# Patient Record
Sex: FEMALE | Race: WHITE | Employment: UNEMPLOYED | URBAN - NONMETROPOLITAN AREA
[De-identification: names, ages, dates, MRNs, and addresses within clinical notes are randomized per-mention and may not be internally consistent; named-entity substitution may affect disease eponyms.]

---

## 2017-02-03 ENCOUNTER — TELEPHONE (OUTPATIENT)
Dept: OPHTHALMOLOGY | Age: 47
End: 2017-02-03

## 2017-11-06 ENCOUNTER — TELEPHONE (OUTPATIENT)
Dept: OPTOMETRY | Age: 47
End: 2017-11-06

## 2017-11-06 NOTE — TELEPHONE ENCOUNTER
Patient stopped by window on Wednesday 11/1/2017 and scheduled an appointment for December for her annual CE and stated that she is out of her contacts and asked if she could get a trial pair. Dr. Ladarius Condon was out of the office at that time.   Patient came back today to check up, writer spoke with Dr. Ladarius Condon and was given ok to pull a pair of trial.    Given [employer] bc: 8.6 -2.00 (r) -2.50 (L)

## 2017-11-27 ENCOUNTER — OFFICE VISIT (OUTPATIENT)
Dept: OPTOMETRY | Age: 47
End: 2017-11-27
Payer: COMMERCIAL

## 2017-11-27 DIAGNOSIS — H52.13 MYOPIA OF BOTH EYES WITH ASTIGMATISM AND PRESBYOPIA: Primary | ICD-10-CM

## 2017-11-27 DIAGNOSIS — H52.203 MYOPIA OF BOTH EYES WITH ASTIGMATISM AND PRESBYOPIA: Primary | ICD-10-CM

## 2017-11-27 DIAGNOSIS — H52.4 MYOPIA OF BOTH EYES WITH ASTIGMATISM AND PRESBYOPIA: Primary | ICD-10-CM

## 2017-11-27 PROCEDURE — 92014 COMPRE OPH EXAM EST PT 1/>: CPT | Performed by: OPTOMETRIST

## 2017-11-27 RX ORDER — BENOXINATE HCL/FLUORESCEIN SOD 0.4%-0.25%
1 DROPS OPHTHALMIC (EYE) ONCE
Status: COMPLETED | OUTPATIENT
Start: 2017-11-27 | End: 2017-11-27

## 2017-11-27 RX ORDER — ALBUTEROL SULFATE 2.5 MG/3ML
2.5 SOLUTION RESPIRATORY (INHALATION)
COMMUNITY
Start: 2017-06-30

## 2017-11-27 RX ADMIN — Medication 1 DROP: at 14:32

## 2017-11-27 ASSESSMENT — KERATOMETRY
OS_AXISANGLE_DEGREES: 091
OD_K2POWER_DIOPTERS: 46.00
OS_AXISANGLE2_DEGREES: 001
OD_AXISANGLE_DEGREES: 090
OD_K1POWER_DIOPTERS: 44.25
OS_K2POWER_DIOPTERS: 45.50
OD_AXISANGLE2_DEGREES: 180
OS_K1POWER_DIOPTERS: 44.75

## 2017-11-27 ASSESSMENT — REFRACTION_CURRENTRX
OD_BASECURVE: 8.3
OS_CYLINDER: DS
OD_BRAND: VISTAKON: ACUVUE 2
OS_BRAND: VISTAKON: ACUVUE 2
OS_SPHERE: -2.00
OS_BASECURVE: 8.3
OD_CYLINDER: DS
OD_SPHERE: -2.00

## 2017-11-27 ASSESSMENT — TONOMETRY
OS_IOP_MMHG: 14
IOP_METHOD: APPLANATION W FLURESS DROP
OD_IOP_MMHG: 14

## 2017-11-27 ASSESSMENT — VISUAL ACUITY
OD_CC: 20/30 OU
CORRECTION_TYPE: CONTACTS
OS_CC: 20/20
METHOD: SNELLEN - LINEAR

## 2017-11-27 ASSESSMENT — REFRACTION_MANIFEST
OS_AXIS: 026
OS_ADD: +1.00
OD_SPHERE: -2.25
OD_ADD: +1.00
OD_CYLINDER: DS
OS_SPHERE: -2.50
OS_CYLINDER: -0.25

## 2017-11-27 ASSESSMENT — REFRACTION_WEARINGRX
OS_AXIS: 020
OD_SPHERE: -1.75
OD_AXIS: 155
OS_SPHERE: -1.75
OS_CYLINDER: -0.75
OD_CYLINDER: -0.50
SPECS_TYPE: SVL

## 2017-11-27 ASSESSMENT — SLIT LAMP EXAM - LIDS
COMMENTS: NORMAL
COMMENTS: NORMAL

## 2017-11-27 NOTE — PROGRESS NOTES
Disha Huber presents today for   Chief Complaint   Patient presents with    Blurred Vision    Vision Exam   .    HPI     Blurred Vision   In both eyes. Vision is blurred. Context:  night driving and reading. Comments   Last Vision Exam: 9/13/2016 AW  Last Ophthalmology Exam: n/a  Last Filled Glasses Rx: 9/24/2013  Insurance: [employer]  Update: Contacts and Glasses  Has noticed that her night vision as become worse.  does not get paid till Friday, will not have the 37.00 until then. Patient is currently wearing Acuvue 2 contacts; does not like the [employer].                       Main Ophthalmology Exam     External Exam       Right Left    External Normal Normal          Slit Lamp Exam       Right Left    Lids/Lashes Normal Normal    Conjunctiva/Sclera White and quiet White and quiet    Cornea Clear Clear    Anterior Chamber Deep and quiet Deep and quiet    Iris Round and reactive Round and reactive    Lens Clear Clear    Vitreous Normal Normal          Fundus Exam       Right Left    Disc Normal Normal    C/D Ratio 0.15 0.15    Macula Normal Normal    Vessels Normal Normal    Periphery Normal Normal                  Tonometry     Tonometry (Applanation w Fluress drop, 2:57 PM)       Right Left    Pressure 14 14                  Visual Acuity (Snellen - Linear)       Right Left    Dist cc 20/20 20/20    Near cc 20/30 OU     Correction:  Contacts        Keratometry     Keratometry       K1 Axis K2 Axis    Right 44.25 180 46.00 090    Left 44.75 001 45.50 091                Ophthalmology Exam     Wearing Rx       Sphere Cylinder Axis    Right -1.75 -0.50 155    Left -1.75 -0.75 020    Age:  4yrs    Type:  SVL                Manifest Refraction     Manifest Refraction       Sphere Cylinder [place] Dist [place] Add Near formerly [facility]    Right -2.25 ds  20/20 +1.00     Left -2.50 -0.25 026 20/25 +1.00 20/20          Manifest Refraction #2 (Auto)       Sphere Cylinder [place] Dist VA Add Near formerly [facility]    Right -2.00

## 2018-01-11 ENCOUNTER — TELEPHONE (OUTPATIENT)
Dept: OPTOMETRY | Age: 48
End: 2018-01-11

## 2018-01-11 NOTE — TELEPHONE ENCOUNTER
Patient would like to order 1 box each eye. Will pay out of pocket for these.    Brand Base Curve Sphere Cylinder   Right [place]: Acuvue 2 8.3 -2.25 ds   Left [place]: Acuvue 2 8.3 -2.50 ds   Expiration Date: 11/28/2018   Wearing Schedule: Daily wear

## 2018-02-05 ENCOUNTER — TELEPHONE (OUTPATIENT)
Dept: OPTOMETRY | Age: 48
End: 2018-02-05

## 2018-04-25 ENCOUNTER — TELEPHONE (OUTPATIENT)
Dept: OPTOMETRY | Age: 48
End: 2018-04-25

## 2019-02-14 ENCOUNTER — OFFICE VISIT (OUTPATIENT)
Dept: OPTOMETRY | Age: 49
End: 2019-02-14
Payer: COMMERCIAL

## 2019-02-14 DIAGNOSIS — H52.13 MYOPIA OF BOTH EYES WITH ASTIGMATISM AND PRESBYOPIA: Primary | ICD-10-CM

## 2019-02-14 DIAGNOSIS — H52.203 MYOPIA OF BOTH EYES WITH ASTIGMATISM AND PRESBYOPIA: Primary | ICD-10-CM

## 2019-02-14 DIAGNOSIS — H52.4 MYOPIA OF BOTH EYES WITH ASTIGMATISM AND PRESBYOPIA: Primary | ICD-10-CM

## 2019-02-14 PROCEDURE — 92014 COMPRE OPH EXAM EST PT 1/>: CPT | Performed by: OPTOMETRIST

## 2019-02-14 ASSESSMENT — REFRACTION_MANIFEST
OS_CYLINDER: -0.25
OD_SPHERE: -2.00
OS_AXIS: 175
OS_ADD: +1.50
OD_CYLINDER: -0.50
OD_ADD: +1.50
OS_SPHERE: -3.00
OD_AXIS: 010

## 2019-02-14 ASSESSMENT — REFRACTION_CURRENTRX
OD_BRAND: VISTAKON: ACUVUE 2
OS_SPHERE: -2.50
OS_BRAND: VISTAKON: ACUVUE 2
OS_BASECURVE: 8.3
OD_CYLINDER: DS
OD_SPHERE: -2.25
OD_BASECURVE: 8.3
OS_CYLINDER: DS

## 2019-02-14 ASSESSMENT — REFRACTION_WEARINGRX
SPECS_TYPE: 1ST BIFOCAL
OD_ADD: +1.00
OS_CYLINDER: -0.25
OD_CYLINDER: DS
OS_AXIS: 026
OD_SPHERE: -2.25
OS_SPHERE: -2.50
OS_ADD: +1.00

## 2019-02-14 ASSESSMENT — TONOMETRY
OD_IOP_MMHG: 10
IOP_METHOD: NON-CONTACT AIR PUFF
OS_IOP_MMHG: 12

## 2019-02-14 ASSESSMENT — VISUAL ACUITY
OS_CC: 20/20
OS_CC+: -1
METHOD: SNELLEN - LINEAR
CORRECTION_TYPE: CONTACTS

## 2019-02-14 ASSESSMENT — KERATOMETRY
OS_K2POWER_DIOPTERS: 45.50
OS_AXISANGLE2_DEGREES: 172
OS_AXISANGLE_DEGREES: 082
OS_K1POWER_DIOPTERS: 44.75

## 2019-02-14 ASSESSMENT — SLIT LAMP EXAM - LIDS
COMMENTS: NORMAL
COMMENTS: NORMAL

## 2019-03-26 ENCOUNTER — TELEPHONE (OUTPATIENT)
Dept: OPTOMETRY | Age: 49
End: 2019-03-26

## 2019-04-22 ENCOUNTER — OFFICE VISIT (OUTPATIENT)
Dept: OPTOMETRY | Age: 49
End: 2019-04-22

## 2019-04-22 DIAGNOSIS — H52.4 MYOPIA OF BOTH EYES WITH ASTIGMATISM AND PRESBYOPIA: Primary | ICD-10-CM

## 2019-04-22 DIAGNOSIS — H52.203 MYOPIA OF BOTH EYES WITH ASTIGMATISM AND PRESBYOPIA: Primary | ICD-10-CM

## 2019-04-22 DIAGNOSIS — H52.13 MYOPIA OF BOTH EYES WITH ASTIGMATISM AND PRESBYOPIA: Primary | ICD-10-CM

## 2019-04-22 PROCEDURE — 99999 PR OFFICE/OUTPT VISIT,PROCEDURE ONLY: CPT | Performed by: OPTOMETRIST

## 2019-04-22 ASSESSMENT — REFRACTION_CURRENTRX
OS_SPHERE: -3.00
OS_BASECURVE: 8.3
OS_CYLINDER: DS
OD_CYLINDER: DS
OS_BRAND: VISTAKON: ACUVUE 2
OD_SPHERE: -2.25
OD_BASECURVE: 8.3
OD_BRAND: VISTAKON: ACUVUE 2

## 2019-04-22 ASSESSMENT — VISUAL ACUITY
CORRECTION_TYPE: CONTACTS
OS_CC+: -1
METHOD: SNELLEN - LINEAR
OS_CC: 20/20
OD_CC: 20/40 OU
OD_CC+: -1

## 2019-05-02 ENCOUNTER — TELEPHONE (OUTPATIENT)
Dept: OPTOMETRY | Age: 49
End: 2019-05-02

## 2019-05-02 NOTE — TELEPHONE ENCOUNTER
Patient would like to order 2 boxes (1 box each eye)    Final Contact Lens Rx        Brand Base Curve Sphere Cylinder     Right B and L ultra  8.6 -2.25 ds     Left B and L ultra  8.6 -3.00 ds     Expiration Date:  4/22/2020     Replacement:  Monthly     Wearing Schedule:  Daily wear

## 2019-12-04 ENCOUNTER — HOSPITAL ENCOUNTER (EMERGENCY)
Age: 49
Discharge: HOME | End: 2019-12-04
Attending: EMERGENCY MEDICINE
Payer: MEDICARE

## 2019-12-04 ENCOUNTER — APPOINTMENT (OUTPATIENT)
Dept: CT IMAGING | Age: 49
End: 2019-12-04
Payer: MEDICARE

## 2019-12-04 VITALS
DIASTOLIC BLOOD PRESSURE: 81 MMHG | HEART RATE: 67 BPM | TEMPERATURE: 98.4 F | RESPIRATION RATE: 16 BRPM | OXYGEN SATURATION: 97 % | SYSTOLIC BLOOD PRESSURE: 136 MMHG

## 2019-12-04 DIAGNOSIS — R55 SYNCOPE AND COLLAPSE: ICD-10-CM

## 2019-12-04 DIAGNOSIS — R51.9 ACUTE NONINTRACTABLE HEADACHE, UNSPECIFIED HEADACHE TYPE: Primary | ICD-10-CM

## 2019-12-04 LAB
-: NORMAL
ABSOLUTE EOS #: 0.2 K/UL (ref 0–0.4)
ABSOLUTE IMMATURE GRANULOCYTE: NORMAL K/UL (ref 0–0.3)
ABSOLUTE LYMPH #: 3.4 K/UL (ref 1–4.8)
ABSOLUTE MONO #: 0.9 K/UL (ref 0.1–1.2)
ANION GAP SERPL CALCULATED.3IONS-SCNC: 10 MMOL/L (ref 9–17)
BASOPHILS # BLD: 1 % (ref 0–1)
BASOPHILS ABSOLUTE: 0.1 K/UL (ref 0–0.2)
BUN BLDV-MCNC: 11 MG/DL (ref 6–20)
BUN/CREAT BLD: 17 (ref 9–20)
CALCIUM SERPL-MCNC: 9.5 MG/DL (ref 8.6–10.4)
CHLORIDE BLD-SCNC: 100 MMOL/L (ref 98–107)
CO2: 26 MMOL/L (ref 20–31)
CREAT SERPL-MCNC: 0.66 MG/DL (ref 0.5–0.9)
DIFFERENTIAL TYPE: NORMAL
EKG ATRIAL RATE: 72 BPM
EKG P AXIS: -4 DEGREES
EKG P-R INTERVAL: 150 MS
EKG Q-T INTERVAL: 400 MS
EKG QRS DURATION: 144 MS
EKG QTC CALCULATION (BAZETT): 438 MS
EKG R AXIS: -23 DEGREES
EKG T AXIS: 68 DEGREES
EKG VENTRICULAR RATE: 72 BPM
EOSINOPHILS RELATIVE PERCENT: 1 % (ref 1–7)
GFR AFRICAN AMERICAN: >60 ML/MIN
GFR NON-AFRICAN AMERICAN: >60 ML/MIN
GFR SERPL CREATININE-BSD FRML MDRD: NORMAL ML/MIN/{1.73_M2}
GFR SERPL CREATININE-BSD FRML MDRD: NORMAL ML/MIN/{1.73_M2}
GLUCOSE BLD-MCNC: 97 MG/DL (ref 70–99)
HCT VFR BLD CALC: 40.5 % (ref 36–46)
HEMOGLOBIN: 13.4 G/DL (ref 12–16)
IMMATURE GRANULOCYTES: NORMAL %
LYMPHOCYTES # BLD: 31 % (ref 16–46)
MCH RBC QN AUTO: 28.7 PG (ref 26–34)
MCHC RBC AUTO-ENTMCNC: 33.1 G/DL (ref 31–37)
MCV RBC AUTO: 86.6 FL (ref 80–100)
MONOCYTES # BLD: 8 % (ref 4–11)
NRBC AUTOMATED: NORMAL PER 100 WBC
PDW BLD-RTO: 14.5 % (ref 11–14.5)
PLATELET # BLD: 238 K/UL (ref 140–450)
PLATELET ESTIMATE: NORMAL
PMV BLD AUTO: 6.9 FL (ref 6–12)
POTASSIUM SERPL-SCNC: 4.5 MMOL/L (ref 3.7–5.3)
RBC # BLD: 4.67 M/UL (ref 4–5.2)
RBC # BLD: NORMAL 10*6/UL
REASON FOR REJECTION: NORMAL
SEG NEUTROPHILS: 59 % (ref 43–77)
SEGMENTED NEUTROPHILS ABSOLUTE COUNT: 6.5 K/UL (ref 1.8–7.7)
SODIUM BLD-SCNC: 136 MMOL/L (ref 135–144)
TROPONIN INTERP: NORMAL
TROPONIN T: NORMAL NG/ML
TROPONIN, HIGH SENSITIVITY: 7 NG/L (ref 0–14)
WBC # BLD: 11 K/UL (ref 3.5–11)
WBC # BLD: NORMAL 10*3/UL
ZZ NTE CLEAN UP: ORDERED TEST: NORMAL
ZZ NTE WITH NAME CLEAN UP: SPECIMEN SOURCE: NORMAL

## 2019-12-04 PROCEDURE — 85025 COMPLETE CBC W/AUTO DIFF WBC: CPT

## 2019-12-04 PROCEDURE — 80048 BASIC METABOLIC PNL TOTAL CA: CPT

## 2019-12-04 PROCEDURE — 6360000002 HC RX W HCPCS: Performed by: EMERGENCY MEDICINE

## 2019-12-04 PROCEDURE — 93005 ELECTROCARDIOGRAM TRACING: CPT | Performed by: EMERGENCY MEDICINE

## 2019-12-04 PROCEDURE — 70450 CT HEAD/BRAIN W/O DYE: CPT

## 2019-12-04 PROCEDURE — 99284 EMERGENCY DEPT VISIT MOD MDM: CPT

## 2019-12-04 PROCEDURE — 96374 THER/PROPH/DIAG INJ IV PUSH: CPT

## 2019-12-04 PROCEDURE — 36415 COLL VENOUS BLD VENIPUNCTURE: CPT

## 2019-12-04 PROCEDURE — 84484 ASSAY OF TROPONIN QUANT: CPT

## 2019-12-04 RX ORDER — SIMVASTATIN 10 MG
10 TABLET ORAL NIGHTLY
COMMUNITY

## 2019-12-04 RX ORDER — KETOROLAC TROMETHAMINE 30 MG/ML
30 INJECTION, SOLUTION INTRAMUSCULAR; INTRAVENOUS ONCE
Status: COMPLETED | OUTPATIENT
Start: 2019-12-04 | End: 2019-12-04

## 2019-12-04 RX ORDER — FLUTICASONE FUROATE AND VILANTEROL 100; 25 UG/1; UG/1
POWDER RESPIRATORY (INHALATION) DAILY
COMMUNITY

## 2019-12-04 RX ORDER — CITALOPRAM 10 MG/1
10 TABLET ORAL DAILY
COMMUNITY

## 2019-12-04 RX ADMIN — KETOROLAC TROMETHAMINE 30 MG: 30 INJECTION, SOLUTION INTRAMUSCULAR at 13:58

## 2019-12-04 ASSESSMENT — PAIN SCALES - GENERAL
PAINLEVEL_OUTOF10: 9

## 2019-12-04 ASSESSMENT — ENCOUNTER SYMPTOMS
SHORTNESS OF BREATH: 0
EYE PAIN: 0
ABDOMINAL PAIN: 0
NAUSEA: 0
BACK PAIN: 0
VOMITING: 0
DIARRHEA: 0
COUGH: 0

## 2019-12-04 ASSESSMENT — PAIN DESCRIPTION - DESCRIPTORS: DESCRIPTORS: PRESSURE

## 2019-12-04 ASSESSMENT — PAIN DESCRIPTION - PAIN TYPE: TYPE: ACUTE PAIN

## 2019-12-04 ASSESSMENT — PAIN DESCRIPTION - LOCATION: LOCATION: HEAD

## 2020-02-24 ENCOUNTER — OFFICE VISIT (OUTPATIENT)
Dept: OPTOMETRY | Age: 50
End: 2020-02-24
Payer: COMMERCIAL

## 2020-02-24 PROCEDURE — 92014 COMPRE OPH EXAM EST PT 1/>: CPT | Performed by: OPTOMETRIST

## 2020-02-24 ASSESSMENT — REFRACTION_CURRENTRX
OS_BASECURVE: 8.6
OD_BASECURVE: 8.6
OS_BRAND: B AND L ULTRA
OS_SPHERE: -3.00
OS_CYLINDER: DS
OD_BRAND: B AND L ULTRA
OD_CYLINDER: DS
OD_SPHERE: -2.25

## 2020-02-24 ASSESSMENT — REFRACTION_MANIFEST
OD_SPHERE: -2.00
OS_SPHERE: -3.00
OD_ADD: +1.75
OS_CYLINDER: DS
OS_ADD: +1.75
OD_AXIS: 010
OD_CYLINDER: -0.75

## 2020-02-24 ASSESSMENT — TONOMETRY
OD_IOP_MMHG: 16
OS_IOP_MMHG: 19
IOP_METHOD: NON-CONTACT AIR PUFF

## 2020-02-24 ASSESSMENT — SLIT LAMP EXAM - LIDS
COMMENTS: NORMAL
COMMENTS: NORMAL

## 2020-02-24 ASSESSMENT — KERATOMETRY
OD_AXISANGLE2_DEGREES: 007
OD_K2POWER_DIOPTERS: 45.75
OD_K1POWER_DIOPTERS: 44.25
METHOD_AUTO_MANUAL: AUTOMATED
OD_AXISANGLE_DEGREES: 097

## 2020-02-24 ASSESSMENT — REFRACTION_WEARINGRX
OS_SPHERE: -2.50
OS_CYLINDER: -0.25
OD_ADD: +1.00
OD_SPHERE: -2.25
OS_AXIS: 026
OD_CYLINDER: DS
SPECS_TYPE: 1ST BIFOCAL
OS_ADD: +1.00

## 2020-02-24 ASSESSMENT — VISUAL ACUITY
METHOD: SNELLEN - LINEAR
OS_CC: 20/25
CORRECTION_TYPE: CONTACTS

## 2020-02-24 NOTE — PROGRESS NOTES
Concern    None   Social History Narrative    None     Past Medical History:   Diagnosis Date    Anxiety     Congenital heart disease     Status post repair of an endocardial cushion defect at age 3-1/2. Cleft mitral valve with mitral regurgitation. Tricuspid regurgitation.     Depression     Myopia        Neuro/Psych     Neuro/Psych     Oriented x3:  Yes    Mood/Affect:  Normal                Main Ophthalmology Exam     External Exam       Right Left    External Normal Normal          Slit Lamp Exam       Right Left    Lids/Lashes Normal Normal    Conjunctiva/Sclera White and quiet White and quiet    Cornea Clear Clear    Anterior Chamber Deep and quiet Deep and quiet    Iris Round and reactive Round and reactive    Lens Clear Clear    Vitreous Normal Normal          Fundus Exam       Right Left    Disc Normal Normal    C/D Ratio 0.15-.2 0.15-.2    Macula Normal Normal    Vessels Normal Normal                  Tonometry     Tonometry (Non-contact air puff, 11:26 AM)       Right Left    Pressure 16 19   IOPg: 15.0              16.9  CH:  9.6          8.6  WS: 9.1          4.4                     Visual Acuity (Snellen - Linear)       Right Left    Dist cc 20/20 20/25    Correction:  Contacts        Keratometry     Keratometry (Automated)       K1 Axis K2 Axis    Right 44.25 007 45.75 097    Left                    Ophthalmology Exam     Wearing Rx       Sphere Cylinder Axis Add    Right -2.25 ds  +1.00    Left -2.50 -0.25 026 +1.00    Age:  3yrs    Type:  1st bifocal                 Manifest Refraction     Manifest Refraction       Sphere Cylinder Axis Dist VA Add    Right -2.00 -0.75 010 20/20 +1.75    Left -3.00 ds  20/20 +1.75          Manifest Refraction #2 (Auto)       Sphere Cylinder Axis Dist VA Add    Right -2.00 -0.75 009      Left -3.00 -0.25 014                   Final Rx       Sphere Cylinder Axis Add    Right -2.00 -0.75 010 +1.75    Left -3.00 ds  +1.75    Type:  PAL    Expiration Date:  2/24/2022 Contact Lens Current Rx     Current Contact Lens Rx (Ordered)       Brand Base Curve Sphere Cylinder    Right B and L ultra  8.6 -2.25 ds    Left B and L ultra  8.6 -3.00 ds          Current Contact Lens Rx #2 (Trial Lens)       Brand Base Curve Sphere Cylinder    Right ciba dailies aquacomfort plus   -2.50 ds    Left ciba dailies aquacomfort plus   -3.00 ds                We will try the 1 day moist distance only;  Use +1.50 or +1.75 readers over the top        Plan:     1. Myopia of both eyes with astigmatism and presbyopia             Patient Instructions   Gave trials of the dailies aqua comfott plus  distance only;      Suggest +1.50 -+1.75 over the top of the contact lenses for reading      Return in about 1 week (around 3/2/2020) for contact lens follow-up.

## 2020-03-10 ENCOUNTER — OFFICE VISIT (OUTPATIENT)
Dept: OPTOMETRY | Age: 50
End: 2020-03-10
Payer: MEDICARE

## 2020-03-10 PROCEDURE — 99211 OFF/OP EST MAY X REQ PHY/QHP: CPT

## 2020-03-10 PROCEDURE — 99999 PR OFFICE/OUTPT VISIT,PROCEDURE ONLY: CPT | Performed by: OPTOMETRIST

## 2020-03-10 ASSESSMENT — REFRACTION_WEARINGRX
OS_ADD: +1.75
OD_CYLINDER: -0.75
SPECS_TYPE: PAL
OD_SPHERE: -2.00
OD_AXIS: 010
OD_ADD: +1.75
OS_SPHERE: -3.00
OS_CYLINDER: DS

## 2020-03-10 ASSESSMENT — REFRACTION_CURRENTRX
OS_CYLINDER: DS
OD_CYLINDER: DS
OD_SPHERE: -2.50
OS_SPHERE: -3.00

## 2020-03-10 ASSESSMENT — VISUAL ACUITY
OS_CC: 20/25
METHOD: SNELLEN - LINEAR
CORRECTION_TYPE: CONTACTS

## 2020-03-19 ENCOUNTER — TELEPHONE (OUTPATIENT)
Dept: OPTOMETRY | Age: 50
End: 2020-03-19

## 2020-03-19 NOTE — TELEPHONE ENCOUNTER
Pt called complaining she was never told that to have glasses shipped to home would cost her $10.68 refused to pay is having glasses shipped back want her RX sent to her home with out being charged she is going elsewhere

## 2020-04-14 ENCOUNTER — TELEPHONE (OUTPATIENT)
Dept: OPTOMETRY | Age: 50
End: 2020-04-14

## 2020-05-14 ENCOUNTER — TELEPHONE (OUTPATIENT)
Dept: OPTOMETRY | Age: 50
End: 2020-05-14

## 2020-05-14 NOTE — TELEPHONE ENCOUNTER
Pull B and L ultra  -2.25 for the right eye and -3.00 for the left eye and these are the B and L lenses she was given at her exam visit. Please offer her an appointment in June for blurred vision with the glasses. / and or a copy of her glasses prescription can also be given when she picks up her trials.

## 2020-05-14 NOTE — TELEPHONE ENCOUNTER
Called patient. She will  trials on Monday and schedule appointment to be seen with new glasses. Patient stated she wanted it made right since she can not see with the glasses.

## 2020-05-26 ENCOUNTER — TELEPHONE (OUTPATIENT)
Dept: OPTOMETRY | Age: 50
End: 2020-05-26

## 2020-05-26 NOTE — TELEPHONE ENCOUNTER
Patient called stating that the trial contacts were not the ones that she needed. Patient stated that the ones she needed is the Acuvue Oasys every 2 wk Dailys. So patient is asking for a trial pair of those.        Final Contact Lens Rx #2      Brand Base Curve Sphere Cylinder   Right acuvue oaysis  8.4 -2.50 ds   Left acuvue oaysis  8.4 -3.00 ds   Replacement: Every 2 weeks   Wearing Schedule: Daily wear

## 2020-06-25 ENCOUNTER — OFFICE VISIT (OUTPATIENT)
Dept: OPTOMETRY | Age: 50
End: 2020-06-25
Payer: COMMERCIAL

## 2020-06-25 PROCEDURE — 92014 COMPRE OPH EXAM EST PT 1/>: CPT | Performed by: OPTOMETRIST

## 2020-06-25 ASSESSMENT — REFRACTION_WEARINGRX
OD_AXIS: 010
OS_CYLINDER: DS
OD_CYLINDER: -0.25
SPECS_TYPE: PAL
OD_SPHERE: -2.25
OS_SPHERE: -2.75

## 2020-06-25 ASSESSMENT — REFRACTION_MANIFEST
OD_SPHERE: -2.25
OS_CYLINDER: -0.25
OD_AXIS: 020
OS_SPHERE: -3.25
OD_CYLINDER: -0.50
OS_AXIS: 180

## 2020-06-25 ASSESSMENT — VISUAL ACUITY
OD_CC: 20/20 OU
OS_CC: 20/50
CORRECTION_TYPE: GLASSES
OS_CC+: -1
OD_CC+: -1
METHOD: SNELLEN - LINEAR

## 2020-06-25 ASSESSMENT — TONOMETRY
OD_IOP_MMHG: 17
OS_IOP_MMHG: 21
IOP_METHOD: NON-CONTACT AIR PUFF

## 2020-06-25 ASSESSMENT — REFRACTION_CURRENTRX
OD_BRAND: ACUVUE OAYSIS
OS_BASECURVE: 8.4
OD_BASECURVE: 8.4
OS_SPHERE: -3.00
OD_CYLINDER: DS
OS_CYLINDER: DS
OS_BRAND: ACUVUE OAYSIS
OD_SPHERE: -2.50

## 2020-06-25 ASSESSMENT — SLIT LAMP EXAM - LIDS
COMMENTS: NORMAL
COMMENTS: NORMAL

## 2020-06-25 NOTE — PROGRESS NOTES
Andree Breaux presents today for   Chief Complaint   Patient presents with    Blurred Vision    Vision Exam   .    HPI     Blurred Vision     In both eyes. Vision is blurred. Context:  distance vision and reading. Comments     Last Vision Exam: 3/10/2020 Aw  Last Ophthalmology Exam: n/a  Last Filled Glasses Rx: 3/10/2020  Insurance: [employer]  Update: Glasses and contacts  Distance and reading are still blurry  Has to be right on top of road signs to see them clearly.   Likes the Acuvue Oasys contacts  The left eye seems worse again   The near is ok                      Family History   Problem Relation Age of Onset    Diabetes Mother     Cataracts Mother     Diabetes Father     Glaucoma Neg Hx        Social History     Socioeconomic History    Marital status:      Spouse name: None    Number of children: None    Years of education: None    Highest education level: None   Occupational History    None   Social Needs    Financial resource strain: None    Food insecurity     Worry: None     Inability: None    Transportation needs     Medical: None     Non-medical: None   Tobacco Use    Smoking status: Never Smoker    Smokeless tobacco: Never Used   Substance and Sexual Activity    Alcohol use: No    Drug use: No    Sexual activity: None   Lifestyle    Physical activity     Days per week: None     Minutes per session: None    Stress: None   Relationships    Social connections     Talks on phone: None     Gets together: None     Attends Rastafari service: None     Active member of club or organization: None     Attends meetings of clubs or organizations: None     Relationship status: None    Intimate partner violence     Fear of current or ex partner: None     Emotionally abused: None     Physically abused: None     Forced sexual activity: None   Other Topics Concern    None   Social History Narrative    None     Past Medical History:   Diagnosis Date    Anxiety     Congenital heart disease     Status post repair of an endocardial cushion defect at age 3-1/2. Cleft mitral valve with mitral regurgitation. Tricuspid regurgitation.     Depression     Myopia        Neuro/Psych     Neuro/Psych     Oriented x3:  Yes    Mood/Affect:  Normal                Main Ophthalmology Exam     External Exam       Right Left    External Normal Normal          Slit Lamp Exam       Right Left    Lids/Lashes Normal Normal    Conjunctiva/Sclera White and quiet White and quiet    Cornea Clear Clear    Anterior Chamber Deep and quiet Deep and quiet    Iris Round and reactive Round and reactive    Lens Trace Nuclear sclerosis Trace Nuclear sclerosis    Vitreous Normal Normal          Fundus Exam       Right Left    Disc Normal Normal    C/D Ratio 0.15-.2 0.15-.2    Macula Normal Normal    Vessels Normal Normal                  Tonometry     Tonometry (Non-contact air puff, 3:56 PM)       Right Left    Pressure 17 21   IOPg:  15.8             17.5  CH:  9.7          7.7  WS: 4.7          3.0                     Visual Acuity (Snellen - Linear)       Right Left    Dist cc 20/25 -1 20/50 -1    Near cc 20/20 OU     Correction:  Glasses         Not recorded          Ophthalmology Exam     Wearing Rx       Sphere Cylinder Axis    Right -2.25 -0.25 010    Left -2.75 ds     Age:  3m    Type:  PAL                Manifest Refraction     Manifest Refraction       Sphere Cylinder [place] Dist VA    Right -2.25 -0.50 020 20/20-    Left -3.25 -0.25 180 20/20-          Manifest Refraction #2 (Auto)       Sphere Cylinder [place] Dist VA    Right -2.00 -0.75 019     Left -3.25 -0.25 180                  Final Rx       Sphere Cylinder Axis Add    Right -2.25 -0.50 020 +2.00    Left -3.25 -0.25 180 +2.00    Type:  PAL    Expiration Date:  6/26/2022           Contact Lens Current Rx     Current Contact Lens Rx       Brand Base Curve Sphere Cylinder    Right acuvue oaysis  8.4 -2.50 ds    Left acuvue oaysis  8.4 -3.00 ds

## 2020-06-29 ENCOUNTER — TELEPHONE (OUTPATIENT)
Dept: OPTOMETRY | Age: 50
End: 2020-06-29

## 2020-06-29 NOTE — TELEPHONE ENCOUNTER
Patient called stating that she did not want to place an order for contact lens yet, since she was being seen for stability of her eyes. She stated she would prefer to wait until she finds out what is going with her eyes. Patient tried calling after her appointment on 6/25/20, but our office was already closed and she called again on 6/26/20 and our office was closed on that day as well.

## 2020-07-27 ENCOUNTER — OFFICE VISIT (OUTPATIENT)
Dept: OPTOMETRY | Age: 50
End: 2020-07-27
Payer: MEDICARE

## 2020-07-27 PROCEDURE — 99212 OFFICE O/P EST SF 10 MIN: CPT | Performed by: OPTOMETRIST

## 2020-07-27 PROCEDURE — 1036F TOBACCO NON-USER: CPT | Performed by: OPTOMETRIST

## 2020-07-27 PROCEDURE — 3017F COLORECTAL CA SCREEN DOC REV: CPT | Performed by: OPTOMETRIST

## 2020-07-27 PROCEDURE — G8427 DOCREV CUR MEDS BY ELIG CLIN: HCPCS | Performed by: OPTOMETRIST

## 2020-07-27 PROCEDURE — 99211 OFF/OP EST MAY X REQ PHY/QHP: CPT

## 2020-07-27 PROCEDURE — G8421 BMI NOT CALCULATED: HCPCS | Performed by: OPTOMETRIST

## 2020-07-27 ASSESSMENT — REFRACTION_CURRENTRX
OS_BRAND: ACUVUE OAYSIS
OS_BASECURVE: 8.4
OD_SPHERE: -2.50
OD_BRAND: ACUVUE OAYSIS
OS_SPHERE: -3.00
OD_BASECURVE: 8.4
OD_CYLINDER: DS
OS_CYLINDER: DS

## 2020-07-27 ASSESSMENT — REFRACTION_MANIFEST
OD_CYLINDER: -0.50
OD_AXIS: 010
OD_ADD: +2.00
OS_CYLINDER: DS
OS_SPHERE: -3.25
OS_ADD: +2.00
OD_SPHERE: -2.50

## 2020-07-27 ASSESSMENT — REFRACTION_WEARINGRX
OS_CYLINDER: -0.25
OD_AXIS: 020
OS_SPHERE: -3.25
OS_ADD: +2.00
OD_CYLINDER: -0.50
OD_SPHERE: -2.25
OD_ADD: +2.00
OS_AXIS: 180

## 2020-07-27 ASSESSMENT — VISUAL ACUITY
OS_CC: 20/70
OD_CC+: -2
CORRECTION_TYPE: GLASSES
OD_CC: 20/25 OU
METHOD: SNELLEN - LINEAR

## 2020-07-27 ASSESSMENT — TONOMETRY
OS_IOP_MMHG: 19
OD_IOP_MMHG: 18
IOP_METHOD: NON-CONTACT AIR PUFF

## 2020-07-27 NOTE — PROGRESS NOTES
Merry Evans presents today for   Chief Complaint   Patient presents with    Blurred Vision    Vision Exam   .    HPI     Blurred Vision     In both eyes. Vision is blurred. Context:  distance vision and reading. Comments     Last Vision Exam: 6/25/2020 Aw  Last Ophthalmology Exam: n/a  Last Filled Glasses Rx: 6/25/2020  Insurance: [facility]  Update: 1 month Rx check for stability                   Current Outpatient Medications   Medication Sig Dispense Refill    simvastatin (ZOCOR) 10 MG tablet Take 10 mg by mouth nightly      fluticasone-vilanterol (BREO ELLIPTA) 100-25 MCG/INH AEPB inhaler Inhale into the lungs daily      citalopram (CELEXA) 10 MG tablet Take 10 mg by mouth daily      albuterol (PROVENTIL) (2.5 MG/3ML) 0.083% nebulizer solution Inhale 2.5 mg into the lungs      zolpidem (AMBIEN) 5 MG tablet Take 5 mg by mouth nightly as needed for Sleep      aspirin 81 MG tablet Take 81 mg by mouth daily.  calcium citrate-vitamin D (CITRICAL + D) 315-250 MG-UNIT TABS Take 600 mg by mouth Daily.  hydrochlorothiazide (HYDRODIURIL) 12.5 MG tablet Take 12.5 mg by mouth as needed.  loratadine (CLARITIN) 10 MG tablet Take 10 mg by mouth daily.  busPIRone (BUSPAR) 10 MG tablet Take 10 mg by mouth 2 times daily.  ibuprofen (ADVIL;MOTRIN) 600 MG tablet Take 600 mg by mouth every 6 hours as needed.  HYDROcodone-acetaminophen (NORCO)  MG per tablet Take 1 tablet by mouth every 6 hours as needed. No current facility-administered medications for this visit.           Family History   Problem Relation Age of Onset    Diabetes Mother     Cataracts Mother     Diabetes Father     Glaucoma Neg Hx      Social History     Socioeconomic History    Marital status:      Spouse name: None    Number of children: None    Years of education: None    Highest education level: None   Occupational History    None   Social Needs    Financial resource strain: None  Food insecurity     Worry: None     Inability: None    Transportation needs     Medical: None     Non-medical: None   Tobacco Use    Smoking status: Never Smoker    Smokeless tobacco: Never Used   Substance and Sexual Activity    Alcohol use: No    Drug use: No    Sexual activity: None   Lifestyle    Physical activity     Days per week: None     Minutes per session: None    Stress: None   Relationships    Social connections     Talks on phone: None     Gets together: None     Attends Mandaeism service: None     Active member of club or organization: None     Attends meetings of clubs or organizations: None     Relationship status: None    Intimate partner violence     Fear of current or ex partner: None     Emotionally abused: None     Physically abused: None     Forced sexual activity: None   Other Topics Concern    None   Social History Narrative    None     Past Medical History:   Diagnosis Date    Anxiety     Congenital heart disease     Status post repair of an endocardial cushion defect at age 3-1/2. Cleft mitral valve with mitral regurgitation. Tricuspid regurgitation.     Depression     Myopia             Not recorded         Tonometry     Tonometry (Non-contact air puff, 4:23 PM)       Right Left    Pressure 18 19   IOP.3             16.6  CH:  9.6          8.3  WS: 3.9          6.6                  Not recorded         Not recorded          Visual Acuity (Snellen - Linear)       Right Left    Dist cc 20/30 -2 20/70    Near cc 20/25 OU     Correction:  Glasses          Pupils     Pupils       Pupils    Right PERRL    Left PERRL              Neuro/Psych     Neuro/Psych     Oriented x3:  Yes    Mood/Affect:  Normal               Not recorded            Ophthalmology Exam     Wearing Rx       Sphere Cylinder Axis Add    Right -2.25 -0.50 020 +2.00    Left -3.25 -0.25 180 +2.00    Age:  1m    Type:  PAL : NOT FILLED          Wearing Rx #2       Sphere Cylinder Axis Add    Right -2.25 -0.25 010     Left -2.75 ds      Age:  4m    Type:  PAL : WEARING              Manifest Refraction     Manifest Refraction       Sphere Cylinder Axis Dist VA Add    Right -2.50 -0.50 010 20/25 +2.00    Left -3.25 ds  20/25 +2.00          Manifest Refraction #2 (Auto)       Sphere Cylinder Axis Dist VA Add    Right -2.00 -0.50 015      Left -3.00 -0.25 176                 Final Rx       Sphere Cylinder Axis Add    Right -2.50 -0.50 010 +2.00    Left -3.25 ds  +2.00    Expiration Date:  7/28/2022            1. Blurred vision, bilateral    2. Changes in vision    3.  Myopia of both eyes with astigmatism and presbyopia           Patient Instructions   New contact lenses trials given;  Call to order      Return in about 1 year (around 7/27/2021) for complete eye exam.

## 2021-01-21 NOTE — PROGRESS NOTES
Chad Nair presents today for   Chief Complaint   Patient presents with    2 Week Follow-Up   . HPI     Pt here today for contact lens check. Acuvu 2 with out bifocal , pt doesn't like them. Patient feels she isn't seeing as good as she could. Says she can't look at phone to read messages, and can not see the TV . Pt is not happy. isnt happy with oaysis or avuvue2                  Visual Acuity (Snellen - Linear)       Right Left    Dist cc 20/20 -1 20/20 -1    Near cc 20/40 OU      Correction:  Contacts          Contact Lens Current Rx     Current Contact Lens Rx       Brand Base Curve Sphere Cylinder    Right [place]: Acuvue 2 8.3 -2.25 ds    Left [place]: Acuvue 2 8.3 -3.00 ds                FINAL RX  Final Contact Lens Rx       Brand Base Curve Sphere Cylinder    Right B and L ultra  8.6 -2.25 ds    Left B and L ultra  8.6 -3.00 ds    Expiration Date:  4/22/2020    Replacement:  Monthly    Wearing Schedule:  Daily wear   Trials given               1. Myopia of both eyes with astigmatism and presbyopia         Patient Instructions   New trials given ;  Ultra   2weeks follow up ; Or call ahead of time if you want rosietamir to try      Return in about 2 weeks (around 5/6/2019) for contact lens follow-up.
No

## 2022-06-02 ENCOUNTER — OFFICE VISIT (OUTPATIENT)
Dept: OPTOMETRY | Age: 52
End: 2022-06-02
Payer: COMMERCIAL

## 2022-06-02 DIAGNOSIS — H52.4 MYOPIA OF BOTH EYES WITH ASTIGMATISM AND PRESBYOPIA: Primary | ICD-10-CM

## 2022-06-02 DIAGNOSIS — H52.203 MYOPIA OF BOTH EYES WITH ASTIGMATISM AND PRESBYOPIA: Primary | ICD-10-CM

## 2022-06-02 DIAGNOSIS — H53.8 BLURRED VISION, BILATERAL: ICD-10-CM

## 2022-06-02 DIAGNOSIS — H52.13 MYOPIA OF BOTH EYES WITH ASTIGMATISM AND PRESBYOPIA: Primary | ICD-10-CM

## 2022-06-02 PROCEDURE — 92015 DETERMINE REFRACTIVE STATE: CPT | Performed by: OPTOMETRIST

## 2022-06-02 PROCEDURE — 92014 COMPRE OPH EXAM EST PT 1/>: CPT | Performed by: OPTOMETRIST

## 2022-06-02 ASSESSMENT — REFRACTION_CURRENTRX
OS_SPHERE: -3.00
OD_SPHERE: -2.50
OD_BRAND: ACUVUE OAYSIS
OS_BASECURVE: 8.4
OD_BASECURVE: 8.4
OS_CYLINDER: DS
OS_BRAND: ACUVUE OAYSIS
OD_CYLINDER: DS

## 2022-06-02 ASSESSMENT — REFRACTION_WEARINGRX
OD_SPHERE: -2.50
OD_ADD: +2.00
OS_SPHERE: -3.25
OD_AXIS: 010
OS_CYLINDER: DS
OD_CYLINDER: -0.50
OS_ADD: +2.00
SPECS_TYPE: PAL

## 2022-06-02 ASSESSMENT — VISUAL ACUITY
OS_CC+: -1
OS_CC: 20/25
OD_CC+: -1
CORRECTION_TYPE: GLASSES
METHOD: SNELLEN - LINEAR
OD_CC: 20/25 OU

## 2022-06-02 ASSESSMENT — REFRACTION_MANIFEST
OS_CYLINDER: -0.25
OD_SPHERE: -2.25
OS_ADD: +2.25
OD_AXIS: 020
OD_ADD: +2.25
OS_SPHERE: -3.25
OD_CYLINDER: -0.75
OS_AXIS: 165

## 2022-06-02 ASSESSMENT — ENCOUNTER SYMPTOMS
RESPIRATORY NEGATIVE: 0
ALLERGIC/IMMUNOLOGIC NEGATIVE: 0
GASTROINTESTINAL NEGATIVE: 0
EYES NEGATIVE: 0

## 2022-06-02 ASSESSMENT — TONOMETRY
IOP_METHOD: NON-CONTACT AIR PUFF
OS_IOP_MMHG: 18
OD_IOP_MMHG: 16

## 2022-06-02 ASSESSMENT — KERATOMETRY
OD_AXISANGLE2_DEGREES: 010
OD_K1POWER_DIOPTERS: 44.50
METHOD_AUTO_MANUAL: AUTOMATED
OS_K2POWER_DIOPTERS: 46.00
OS_AXISANGLE2_DEGREES: 172
OD_AXISANGLE_DEGREES: 100
OS_AXISANGLE_DEGREES: 082
OD_K2POWER_DIOPTERS: 45.75
OS_K1POWER_DIOPTERS: 45.00

## 2022-06-02 NOTE — PROGRESS NOTES
Elisabet Devine presents today for   Chief Complaint   Patient presents with    Blurred Vision    Vision Exam   .    HPI     Blurred Vision     In both eyes. Vision is blurred. Context:  distance vision and reading. Comments     Last Vision Exam: 7/27/2020 Aw  Last Ophthalmology Exam: n/a  Last Filled Glasses Rx: 7/27/2020  Insurance: [employer]  Update: Glasses and Contacts  Distance and reading are getting more blurry                     ROS     Negative for: Constitutional, Gastrointestinal, Neurological, Skin, Genitourinary, Musculoskeletal, HENT, Endocrine, Cardiovascular, Eyes, Respiratory, Psychiatric, Allergic/Imm, Heme/Lymph          Family History   Problem Relation Age of Onset    Diabetes Mother     Cataracts Mother     Diabetes Father     Glaucoma Neg Hx        Social History     Socioeconomic History    Marital status:      Spouse name: None    Number of children: None    Years of education: None    Highest education level: None   Occupational History    None   Tobacco Use    Smoking status: Never Smoker    Smokeless tobacco: Never Used   Substance and Sexual Activity    Alcohol use: No    Drug use: No    Sexual activity: None   Other Topics Concern    None   Social History Narrative    None     Social Determinants of Health     Financial Resource Strain:     Difficulty of Paying Living Expenses: Not on file   Food Insecurity:     Worried About Running Out of Food in the Last Year: Not on file    [place] of Food in the Last Year: Not on file   Transportation Needs:     Lack of Transportation (Medical): Not on file    Lack of Transportation (Non-Medical):  Not on file   Physical Activity:     Days of Exercise per Week: Not on file    Minutes of Exercise per Session: Not on file   Stress:     Feeling of Stress : Not on file   Social Connections:     Frequency of Communication with Friends and Family: Not on file    Frequency of Social Gatherings with Friends and Family: Not on file    Attends Zoroastrianism Services: Not on file    Active Member of Clubs or Organizations: Not on file    Attends Club or Organization Meetings: Not on file    Marital Status: Not on file   Intimate Partner Violence:     Fear of Current or Ex-Partner: Not on file    Emotionally Abused: Not on file    Physically Abused: Not on file    Sexually Abused: Not on file   Housing Stability:     Unable to Pay for Housing in the Last Year: Not on file    Number of [place] in the Last Year: Not on file    Unstable Housing in the Last Year: Not on file     Past Medical History:   Diagnosis Date    Anxiety     Congenital heart disease     Status post repair of an endocardial cushion defect at age 3-1/2. Cleft mitral valve with mitral regurgitation. Tricuspid regurgitation.  Depression     Myopia        Neuro/Psych     Neuro/Psych     Oriented x3:  Yes                Main Ophthalmology Exam     External Exam       Right Left    External Normal Normal             <div id=\"MAIN_EXAM_REVIEWED\"></div>     Tonometry     Tonometry (Non-contact air puff, 3:11 PM)       Right Left    Pressure 16 18   IOP.4             15.9  CH:  9.4          9.3  WS: 9.3          7.2                     Visual Acuity (Snellen - Linear)       Right Left    Dist cc 20/25 -1 20/25 -1    Near cc 20/25 OU     Correction: Glasses        Keratometry     Keratometry (Automated)       K1 Axis K2 Axis    Right 44.50 010 45.75 100    Left 45.00 172 46.00 082                Ophthalmology Exam     Wearing Rx       Sphere Cylinder Axis Add    Right -2.50 -0.50 010 +2.00    Left -3.25 ds  +2.00    Age: 2yrs    Type: PAL                Manifest Refraction     Manifest Refraction       Sphere Cylinder Axis Dist VA Add    Right -2.25 -0.75 020 20/20- +2.25    Left -3.25 -0.25 165 20/20- +2.25          Manifest Refraction #2 (Auto)       Sphere Cylinder Axis Dist VA Add    Right -2.00 -0.75 027      Left -2.75 -0.25 165                   Final Rx       Sphere Cylinder Axis Add    Right -2.25 -0.75 020 +2.25    Left -3.25 -0.25 165 +2.25    Type: PAL    Expiration Date: 6/2/2024           Contact Lens Current Rx     Current Contact Lens Rx       Brand Base Curve Sphere Cylinder Addl. Specs    Right acuvue oaysis  8.4 -2.50 ds     Left acuvue oaysis  8.4 -3.00 ds           Current Contact Lens Rx #2       Brand Base Curve Sphere Cylinder Addl. Specs    Right B and L ultra for prebyopia  8.6 -2.50  high     Left B and L ultra for prebyopia  8.6 -3.25  high                 Final   Final Contact Lens Rx       Brand Base Curve Sphere Addl. Specs    Right B and L ultra for prebyopia  8.6 -2.50 high     Left B and L ultra for prebyopia  8.6 -3.25 high     Expiration Date: 6/3/2023    Wearing Schedule: Daily wear           IMPRESSION:  1. Myopia of both eyes with astigmatism and presbyopia    2. Blurred vision, bilateral        PLAN:      New glasses and contact lense;  Trials given  \"       There are no Patient Instructions on file for this visit. Return in about 2 weeks (around 6/16/2022) for contact lens follow-up.

## 2022-06-13 ENCOUNTER — TELEPHONE (OUTPATIENT)
Dept: OPTOMETRY | Age: 52
End: 2022-06-13

## 2022-06-13 NOTE — TELEPHONE ENCOUNTER
Keep Follow up as scheduled;  wear the contact lenses whenever feels comfortable, as much as possible until follow up;  Have the contact lenses in eyes for the follow up. It takes time to adjust and is always somewhat of a compromise as discussed. We will adjust the RX on follow up.

## 2022-06-13 NOTE — TELEPHONE ENCOUNTER
Patient called stating that the last contact trials given to her are not clear. Can see but still blurred in distance. Stated she can not see road signs until right on them and not comfortable driving in them. Feels that the left eye is struggling, and it causes headaches, but feels the bifocal is good. Patient has a follow up appointment on 6/20/22. How would you like to proceed?     Right B and L ultra for prebyopia  8.6 -2.50 high    Left B and L ultra for prebyopia  8.6 -3.25 high    Expiration Date: 6/3/2023

## 2022-06-15 NOTE — TELEPHONE ENCOUNTER
Called patient to follow up on note. Patient was in the hospital and stated to call back in a few days. n/a

## 2022-06-20 ENCOUNTER — OFFICE VISIT (OUTPATIENT)
Dept: OPTOMETRY | Age: 52
End: 2022-06-20
Payer: MEDICARE

## 2022-06-20 DIAGNOSIS — H52.13 MYOPIA OF BOTH EYES WITH ASTIGMATISM AND PRESBYOPIA: Primary | ICD-10-CM

## 2022-06-20 DIAGNOSIS — H52.4 MYOPIA OF BOTH EYES WITH ASTIGMATISM AND PRESBYOPIA: Primary | ICD-10-CM

## 2022-06-20 DIAGNOSIS — H52.203 MYOPIA OF BOTH EYES WITH ASTIGMATISM AND PRESBYOPIA: Primary | ICD-10-CM

## 2022-06-20 PROCEDURE — 99211 OFF/OP EST MAY X REQ PHY/QHP: CPT

## 2022-06-20 PROCEDURE — 99999 PR OFFICE/OUTPT VISIT,PROCEDURE ONLY: CPT | Performed by: OPTOMETRIST

## 2022-06-20 ASSESSMENT — REFRACTION_CURRENTRX
OS_SPHERE: -3.25
OD_ADDL_SPECS: HIGH
OD_BASECURVE: 8.6
OD_SPHERE: -2.50
OS_BASECURVE: 8.6
OS_ADDL_SPECS: HIGH

## 2022-06-20 ASSESSMENT — REFRACTION_WEARINGRX
OD_AXIS: 020
OD_ADD: +2.25
SPECS_TYPE: PAL
OD_SPHERE: -2.25
OS_CYLINDER: -0.25
OS_AXIS: 165
OD_CYLINDER: -0.75
OS_SPHERE: -3.25
OS_ADD: +2.25

## 2022-06-20 ASSESSMENT — VISUAL ACUITY
METHOD: SNELLEN - LINEAR
CORRECTION_TYPE: CONTACTS
OD_CC: 20/25 OU

## 2022-06-20 ASSESSMENT — ENCOUNTER SYMPTOMS
GASTROINTESTINAL NEGATIVE: 0
EYES NEGATIVE: 0
RESPIRATORY NEGATIVE: 0
ALLERGIC/IMMUNOLOGIC NEGATIVE: 0

## 2022-06-20 NOTE — PROGRESS NOTES
Zane Max presents today for   Chief Complaint   Patient presents with    2 Week Follow-Up   . HPI     2 week contact lens check  Having trouble adjusting ; did not feel safe enough to drive to appointment with contacts in ; put contacts in when she arrived to the office. PCP told her to wait to come back for a follow up until her blood pressure and cholestrol returned to normal.  PCP told her that this is all effecting her vision. When she is wearing the contacts it makes her all dizzy. Current Outpatient Medications   Medication Sig Dispense Refill    simvastatin (ZOCOR) 10 MG tablet Take 10 mg by mouth nightly      fluticasone-vilanterol (BREO ELLIPTA) 100-25 MCG/INH AEPB inhaler Inhale into the lungs daily      citalopram (CELEXA) 10 MG tablet Take 10 mg by mouth daily      albuterol (PROVENTIL) (2.5 MG/3ML) 0.083% nebulizer solution Inhale 2.5 mg into the lungs      zolpidem (AMBIEN) 5 MG tablet Take 5 mg by mouth nightly as needed for Sleep      aspirin 81 MG tablet Take 81 mg by mouth daily.  calcium citrate-vitamin D (CITRICAL + D) 315-250 MG-UNIT TABS Take 600 mg by mouth Daily.  hydrochlorothiazide (HYDRODIURIL) 12.5 MG tablet Take 12.5 mg by mouth as needed.  loratadine (CLARITIN) 10 MG tablet Take 10 mg by mouth daily.  busPIRone (BUSPAR) 10 MG tablet Take 10 mg by mouth 2 times daily.  ibuprofen (ADVIL;MOTRIN) 600 MG tablet Take 600 mg by mouth every 6 hours as needed.  HYDROcodone-acetaminophen (NORCO)  MG per tablet Take 1 tablet by mouth every 6 hours as needed. No current facility-administered medications for this visit.          Family History   Problem Relation Age of Onset    Diabetes Mother     Cataracts Mother     Diabetes Father     Glaucoma Neg Hx      Social History     Socioeconomic History    Marital status:      Spouse name: None    Number of children: None    Years of education: None    Highest education level: None   Occupational History    None   Tobacco Use    Smoking status: Never Smoker    Smokeless tobacco: Never Used   Substance and Sexual Activity    Alcohol use: No    Drug use: No    Sexual activity: None   Other Topics Concern    None   Social History Narrative    None     Social Determinants of Health     Financial Resource Strain:     Difficulty of Paying Living Expenses: Not on file   Food Insecurity:     Worried About Running Out of Food in the Last Year: Not on file    [place] of Food in the Last Year: Not on file   Transportation Needs:     Lack of Transportation (Medical): Not on file    Lack of Transportation (Non-Medical): Not on file   Physical Activity:     Days of Exercise per Week: Not on file    Minutes of Exercise per Session: Not on file   Stress:     Feeling of Stress : Not on file   Social Connections:     Frequency of Communication with Friends and Family: Not on file    Frequency of Social Gatherings with Friends and Family: Not on file    Attends Spiritism Services: Not on file    Active Member of [address] [place] [address] or Organizations: Not on file    Attends Club or Organization Meetings: Not on file    Marital Status: Not on file   Intimate Partner Violence:     Fear of Current or Ex-Partner: Not on file    Emotionally Abused: Not on file    Physically Abused: Not on file    Sexually Abused: Not on file   Housing Stability:     Unable to Pay for Housing in the Last Year: Not on file    Number of [place] in the Last Year: Not on file    Unstable Housing in the Last Year: Not on file     Past Medical History:   Diagnosis Date    Anxiety     Congenital heart disease     Status post repair of an endocardial cushion defect at age 3-1/2. Cleft mitral valve with mitral regurgitation. Tricuspid regurgitation.     Depression     Myopia        ROS     Negative for: Constitutional, Gastrointestinal, Neurological, Skin, Genitourinary, Musculoskeletal, HENT, Endocrine, Cardiovascular, Eyes, Respiratory, Psychiatric, Allergic/Imm, Heme/Lymph          Not recorded        Not recorded       Not recorded       Not recorded         Visual Acuity (Snellen - Linear)       Right Left    Dist cc 20/25 OU Doubled     Near cc 20/25 OU     Correction: Contacts          Not recorded       Not recorded       Not recorded           Ophthalmology Exam     Wearing Rx       Sphere Cylinder Axis Add    Right -2.25 -0.75 020 +2.25    Left -3.25 -0.25 165 +2.25    Age: 1m    Type: PAL              Not recorded              No orders of the defined types were placed in this encounter. IMPRESSION:  1. Myopia of both eyes with astigmatism and presbyopia        PLAN:    1. New contact lens trials given;  -3.00 high in the right eye and -3.50 high in the left eye       There are no Patient Instructions on file for this visit. Return in about 1 month (around 7/20/2022) for contact lens follow-up.